# Patient Record
Sex: FEMALE | Race: BLACK OR AFRICAN AMERICAN | NOT HISPANIC OR LATINO | RURAL
[De-identification: names, ages, dates, MRNs, and addresses within clinical notes are randomized per-mention and may not be internally consistent; named-entity substitution may affect disease eponyms.]

---

## 2022-10-31 ENCOUNTER — OFFICE VISIT (OUTPATIENT)
Dept: PRIMARY CARE CLINIC | Facility: CLINIC | Age: 62
End: 2022-10-31
Payer: COMMERCIAL

## 2022-10-31 VITALS
BODY MASS INDEX: 36.19 KG/M2 | HEIGHT: 64 IN | RESPIRATION RATE: 18 BRPM | DIASTOLIC BLOOD PRESSURE: 72 MMHG | HEART RATE: 74 BPM | SYSTOLIC BLOOD PRESSURE: 138 MMHG | OXYGEN SATURATION: 98 % | WEIGHT: 212 LBS

## 2022-10-31 DIAGNOSIS — E03.9 HYPOTHYROIDISM, UNSPECIFIED TYPE: Primary | ICD-10-CM

## 2022-10-31 DIAGNOSIS — E66.9 OBESITY, UNSPECIFIED CLASSIFICATION, UNSPECIFIED OBESITY TYPE, UNSPECIFIED WHETHER SERIOUS COMORBIDITY PRESENT: ICD-10-CM

## 2022-10-31 DIAGNOSIS — I10 HTN (HYPERTENSION), BENIGN: ICD-10-CM

## 2022-10-31 DIAGNOSIS — E78.2 MIXED HYPERLIPIDEMIA: ICD-10-CM

## 2022-10-31 PROCEDURE — 99214 OFFICE O/P EST MOD 30 MIN: CPT | Mod: ,,, | Performed by: FAMILY MEDICINE

## 2022-10-31 PROCEDURE — 99214 PR OFFICE/OUTPT VISIT, EST, LEVL IV, 30-39 MIN: ICD-10-PCS | Mod: ,,, | Performed by: FAMILY MEDICINE

## 2022-10-31 RX ORDER — CARVEDILOL 3.12 MG/1
3.12 TABLET ORAL 2 TIMES DAILY
COMMUNITY
Start: 2022-10-20

## 2022-10-31 RX ORDER — ASPIRIN 81 MG/1
81 TABLET ORAL DAILY
COMMUNITY

## 2022-10-31 RX ORDER — LEVOTHYROXINE SODIUM 75 UG/1
75 TABLET ORAL EVERY MORNING
COMMUNITY
Start: 2022-10-18

## 2022-10-31 RX ORDER — SIMVASTATIN 40 MG/1
1 TABLET, FILM COATED ORAL NIGHTLY
COMMUNITY
Start: 2022-09-06

## 2022-10-31 RX ORDER — PHENTERMINE HYDROCHLORIDE 37.5 MG/1
37.5 TABLET ORAL
Qty: 30 TABLET | Refills: 0 | Status: SHIPPED | OUTPATIENT
Start: 2022-10-31 | End: 2022-11-30 | Stop reason: SDUPTHER

## 2022-10-31 RX ORDER — TRIAMTERENE/HYDROCHLOROTHIAZID 37.5-25 MG
1 TABLET ORAL EVERY MORNING
COMMUNITY
Start: 2022-09-06

## 2022-10-31 NOTE — PROGRESS NOTES
Subjective:      Patient ID: Karlee Brady is a 62 y.o. female.    Chief Complaint: Obesity    Karlee eller 62 y.o. female presents for follow up on all regular problems which are reviewed and discussed.   Htn  hyperlipid etc  Problem List Items Addressed This Visit          Cardiac/Vascular    HTN (hypertension), benign    Mixed hyperlipidemia       Endocrine    Hypothyroidism - Primary    Obesity       Past Medical History:  Past Medical History:   Diagnosis Date    HTN (hypertension), benign     Mixed hyperlipidemia      Past Surgical History:   Procedure Laterality Date    THYROIDECTOMY, BILATERAL      TUBAL LIGATION       Review of patient's allergies indicates:  No Known Allergies  Current Outpatient Medications on File Prior to Visit   Medication Sig Dispense Refill    aspirin (ECOTRIN) 81 MG EC tablet Take 81 mg by mouth once daily.      carvediloL (COREG) 3.125 MG tablet Take 3.125 mg by mouth 2 (two) times daily.      levothyroxine (SYNTHROID) 75 MCG tablet Take 75 mcg by mouth every morning.      simvastatin (ZOCOR) 40 MG tablet Take 1 tablet by mouth nightly.      triamterene-hydrochlorothiazide 37.5-25 mg (MAXZIDE-25) 37.5-25 mg per tablet Take 1 tablet by mouth every morning.       No current facility-administered medications on file prior to visit.     Social History     Socioeconomic History    Marital status:    Tobacco Use    Smoking status: Never    Smokeless tobacco: Never   Substance and Sexual Activity    Alcohol use: Not Currently    Drug use: Never     Family History   Problem Relation Age of Onset    Stroke Mother     Hypertension Mother     Cancer Father        Review of Systems   Constitutional: Negative.  Negative for activity change, appetite change, chills and diaphoresis.   HENT: Negative.  Negative for congestion, ear pain, hearing loss and postnasal drip.    Eyes:  Negative for itching.   Respiratory:  Negative for chest tightness and shortness of breath.   "  Cardiovascular:  Negative for chest pain.   Gastrointestinal:  Negative for abdominal pain.   Endocrine: Negative for polydipsia.   Genitourinary:  Negative for frequency.   Musculoskeletal:  Negative for back pain.   Neurological:  Negative for headaches.     Objective:     /72 (BP Location: Right arm, Patient Position: Sitting, BP Method: Large (Manual))   Pulse 74   Resp 18   Ht 5' 4" (1.626 m)   Wt 96.2 kg (212 lb)   LMP  (LMP Unknown) Comment: had tubal  SpO2 98%   BMI 36.39 kg/m²     Physical Exam  Constitutional:       Appearance: Normal appearance. She is obese.   HENT:      Head: Normocephalic and atraumatic.      Right Ear: External ear normal.      Left Ear: External ear normal.      Nose: Nose normal.      Mouth/Throat:      Mouth: Mucous membranes are moist.      Pharynx: Oropharynx is clear.   Eyes:      Pupils: Pupils are equal, round, and reactive to light.   Cardiovascular:      Rate and Rhythm: Normal rate and regular rhythm.      Heart sounds: Normal heart sounds.   Pulmonary:      Effort: Pulmonary effort is normal.      Breath sounds: Normal breath sounds.   Abdominal:      Palpations: Abdomen is soft.   Musculoskeletal:      Cervical back: Normal range of motion and neck supple.   Skin:     General: Skin is warm and dry.   Neurological:      General: No focal deficit present.      Mental Status: She is alert and oriented to person, place, and time. Mental status is at baseline.   Psychiatric:         Mood and Affect: Mood normal.         Behavior: Behavior normal.         Thought Content: Thought content normal.         Judgment: Judgment normal.       1. Hypothyroidism, unspecified type    2. HTN (hypertension), benign    3. Mixed hyperlipidemia    4. Obesity, unspecified classification, unspecified obesity type, unspecified whether serious comorbidity present        Plan:     Problem List Items Addressed This Visit          Cardiac/Vascular    HTN (hypertension), benign    " Mixed hyperlipidemia       Endocrine    Hypothyroidism - Primary    Obesity     No follow-ups on file.    Risks benefits discussed  1m fu  Rx sent  I am having Karlee Brady start on phentermine. I am also having her maintain her levothyroxine, carvediloL, simvastatin, triamterene-hydrochlorothiazide 37.5-25 mg, and aspirin.    Karlee was seen today for obesity.    Diagnoses and all orders for this visit:    Hypothyroidism, unspecified type    HTN (hypertension), benign    Mixed hyperlipidemia    Obesity, unspecified classification, unspecified obesity type, unspecified whether serious comorbidity present    Other orders  -     phentermine (ADIPEX-P) 37.5 mg tablet; Take 1 tablet (37.5 mg total) by mouth before breakfast.    Medications Ordered This Encounter   Medications    phentermine (ADIPEX-P) 37.5 mg tablet     Sig: Take 1 tablet (37.5 mg total) by mouth before breakfast.     Dispense:  30 tablet     Refill:  0     [unfilled]  No orders of the defined types were placed in this encounter.

## 2022-11-30 ENCOUNTER — OFFICE VISIT (OUTPATIENT)
Dept: PRIMARY CARE CLINIC | Facility: CLINIC | Age: 62
End: 2022-11-30
Payer: COMMERCIAL

## 2022-11-30 VITALS
HEIGHT: 64 IN | BODY MASS INDEX: 35.68 KG/M2 | SYSTOLIC BLOOD PRESSURE: 130 MMHG | DIASTOLIC BLOOD PRESSURE: 72 MMHG | WEIGHT: 209 LBS | HEART RATE: 91 BPM | OXYGEN SATURATION: 99 % | RESPIRATION RATE: 18 BRPM

## 2022-11-30 DIAGNOSIS — I10 HTN (HYPERTENSION), BENIGN: Primary | ICD-10-CM

## 2022-11-30 DIAGNOSIS — E03.9 HYPOTHYROIDISM, UNSPECIFIED TYPE: ICD-10-CM

## 2022-11-30 DIAGNOSIS — E78.2 MIXED HYPERLIPIDEMIA: ICD-10-CM

## 2022-11-30 DIAGNOSIS — E66.9 OBESITY, UNSPECIFIED CLASSIFICATION, UNSPECIFIED OBESITY TYPE, UNSPECIFIED WHETHER SERIOUS COMORBIDITY PRESENT: ICD-10-CM

## 2022-11-30 PROCEDURE — 99214 OFFICE O/P EST MOD 30 MIN: CPT | Mod: ,,, | Performed by: FAMILY MEDICINE

## 2022-11-30 PROCEDURE — 99214 PR OFFICE/OUTPT VISIT, EST, LEVL IV, 30-39 MIN: ICD-10-PCS | Mod: ,,, | Performed by: FAMILY MEDICINE

## 2022-11-30 RX ORDER — PHENTERMINE HYDROCHLORIDE 37.5 MG/1
37.5 TABLET ORAL
Qty: 30 TABLET | Refills: 0 | Status: SHIPPED | OUTPATIENT
Start: 2022-11-30 | End: 2022-12-21 | Stop reason: SDUPTHER

## 2022-11-30 NOTE — PROGRESS NOTES
Subjective:      Patient ID: Karlee Brady is a 62 y.o. female.    Chief Complaint: Follow-up (1mon. Ck-up) and Obesity (#2 Adipex)    Karlee eller 62 y.o. female presents for follow up on all regular problems which are reviewed and discussed.   -3lbs  212-209  Problem List Items Addressed This Visit          Cardiac/Vascular    HTN (hypertension), benign - Primary    Mixed hyperlipidemia       Endocrine    Hypothyroidism    Obesity       Past Medical History:  Past Medical History:   Diagnosis Date    HTN (hypertension), benign     Mixed hyperlipidemia      Past Surgical History:   Procedure Laterality Date    THYROIDECTOMY, BILATERAL      TUBAL LIGATION       Review of patient's allergies indicates:  No Known Allergies  Current Outpatient Medications on File Prior to Visit   Medication Sig Dispense Refill    aspirin (ECOTRIN) 81 MG EC tablet Take 81 mg by mouth once daily.      carvediloL (COREG) 3.125 MG tablet Take 3.125 mg by mouth 2 (two) times daily.      levothyroxine (SYNTHROID) 75 MCG tablet Take 75 mcg by mouth every morning.      simvastatin (ZOCOR) 40 MG tablet Take 1 tablet by mouth nightly.      triamterene-hydrochlorothiazide 37.5-25 mg (MAXZIDE-25) 37.5-25 mg per tablet Take 1 tablet by mouth every morning.      [DISCONTINUED] phentermine (ADIPEX-P) 37.5 mg tablet Take 1 tablet (37.5 mg total) by mouth before breakfast. 30 tablet 0     No current facility-administered medications on file prior to visit.     Social History     Socioeconomic History    Marital status:    Tobacco Use    Smoking status: Never    Smokeless tobacco: Never   Substance and Sexual Activity    Alcohol use: Not Currently    Drug use: Never     Family History   Problem Relation Age of Onset    Stroke Mother     Hypertension Mother     Cancer Father        Review of Systems   Constitutional: Negative.  Negative for activity change, appetite change, chills and diaphoresis.   HENT: Negative.  Negative for congestion,  "ear pain, hearing loss and postnasal drip.    Eyes:  Negative for itching.   Respiratory:  Negative for chest tightness and shortness of breath.    Cardiovascular:  Negative for chest pain.   Gastrointestinal:  Negative for abdominal pain.   Endocrine: Negative for polydipsia.   Genitourinary:  Negative for frequency.   Musculoskeletal:  Negative for back pain.   Neurological:  Negative for headaches.     Objective:     /72 (BP Location: Right arm, Patient Position: Sitting, BP Method: Large (Manual))   Pulse 91   Resp 18   Ht 5' 4" (1.626 m)   Wt 94.8 kg (209 lb)   LMP  (LMP Unknown) Comment: had tubal  SpO2 99%   BMI 35.87 kg/m²     Physical Exam  Constitutional:       Appearance: Normal appearance. She is obese.   HENT:      Head: Normocephalic and atraumatic.      Right Ear: External ear normal.      Left Ear: External ear normal.      Nose: Nose normal.      Mouth/Throat:      Mouth: Mucous membranes are moist.      Pharynx: Oropharynx is clear.   Eyes:      Pupils: Pupils are equal, round, and reactive to light.   Cardiovascular:      Rate and Rhythm: Normal rate and regular rhythm.      Heart sounds: Normal heart sounds.   Pulmonary:      Effort: Pulmonary effort is normal.      Breath sounds: Normal breath sounds.   Abdominal:      Palpations: Abdomen is soft.   Musculoskeletal:      Cervical back: Normal range of motion and neck supple.   Skin:     General: Skin is warm and dry.   Neurological:      General: No focal deficit present.      Mental Status: She is alert and oriented to person, place, and time. Mental status is at baseline.   Psychiatric:         Mood and Affect: Mood normal.         Behavior: Behavior normal.         Thought Content: Thought content normal.         Judgment: Judgment normal.       1. HTN (hypertension), benign    2. Mixed hyperlipidemia    3. Hypothyroidism, unspecified type    4. Obesity, unspecified classification, unspecified obesity type, unspecified whether " serious comorbidity present        Plan:     Problem List Items Addressed This Visit          Cardiac/Vascular    HTN (hypertension), benign - Primary    Mixed hyperlipidemia       Endocrine    Hypothyroidism    Obesity     No follow-ups on file.  1m fu    I am having Karlee Brady maintain her levothyroxine, carvediloL, simvastatin, triamterene-hydrochlorothiazide 37.5-25 mg, aspirin, and phentermine.    Karlee was seen today for follow-up and obesity.    Diagnoses and all orders for this visit:    HTN (hypertension), benign    Mixed hyperlipidemia    Hypothyroidism, unspecified type    Obesity, unspecified classification, unspecified obesity type, unspecified whether serious comorbidity present    Other orders  -     phentermine (ADIPEX-P) 37.5 mg tablet; Take 1 tablet (37.5 mg total) by mouth before breakfast.    Medications Ordered This Encounter   Medications    phentermine (ADIPEX-P) 37.5 mg tablet     Sig: Take 1 tablet (37.5 mg total) by mouth before breakfast.     Dispense:  30 tablet     Refill:  0     [unfilled]  No orders of the defined types were placed in this encounter.

## 2022-12-21 ENCOUNTER — OFFICE VISIT (OUTPATIENT)
Dept: PRIMARY CARE CLINIC | Facility: CLINIC | Age: 62
End: 2022-12-21
Payer: COMMERCIAL

## 2022-12-21 VITALS
DIASTOLIC BLOOD PRESSURE: 82 MMHG | BODY MASS INDEX: 34.66 KG/M2 | HEART RATE: 86 BPM | SYSTOLIC BLOOD PRESSURE: 122 MMHG | HEIGHT: 64 IN | OXYGEN SATURATION: 97 % | RESPIRATION RATE: 18 BRPM | WEIGHT: 203 LBS

## 2022-12-21 DIAGNOSIS — E66.9 OBESITY, UNSPECIFIED CLASSIFICATION, UNSPECIFIED OBESITY TYPE, UNSPECIFIED WHETHER SERIOUS COMORBIDITY PRESENT: ICD-10-CM

## 2022-12-21 DIAGNOSIS — E03.9 HYPOTHYROIDISM, UNSPECIFIED TYPE: ICD-10-CM

## 2022-12-21 DIAGNOSIS — I10 HTN (HYPERTENSION), BENIGN: Primary | ICD-10-CM

## 2022-12-21 PROCEDURE — 99214 OFFICE O/P EST MOD 30 MIN: CPT | Mod: ,,, | Performed by: FAMILY MEDICINE

## 2022-12-21 PROCEDURE — 99214 PR OFFICE/OUTPT VISIT, EST, LEVL IV, 30-39 MIN: ICD-10-PCS | Mod: ,,, | Performed by: FAMILY MEDICINE

## 2022-12-21 RX ORDER — PHENTERMINE HYDROCHLORIDE 37.5 MG/1
37.5 TABLET ORAL
Qty: 30 TABLET | Refills: 0 | Status: SHIPPED | OUTPATIENT
Start: 2022-12-21 | End: 2023-01-20

## 2022-12-21 NOTE — PROGRESS NOTES
Subjective:      Patient ID: Karlee Brady is a 62 y.o. female.    Chief Complaint: Follow-up (1mon. Ck-up) and Obesity (#3 Adipex)    Karlee eller 62 y.o. female presents for follow up on all regular problems which are reviewed and discussed.   209-203  [9 total]  Problem List Items Addressed This Visit          Cardiac/Vascular    HTN (hypertension), benign - Primary       Endocrine    Hypothyroidism    Obesity       Past Medical History:  Past Medical History:   Diagnosis Date    HTN (hypertension), benign     Mixed hyperlipidemia      Past Surgical History:   Procedure Laterality Date    THYROIDECTOMY, BILATERAL      TUBAL LIGATION       Review of patient's allergies indicates:  No Known Allergies  Current Outpatient Medications on File Prior to Visit   Medication Sig Dispense Refill    aspirin (ECOTRIN) 81 MG EC tablet Take 81 mg by mouth once daily.      carvediloL (COREG) 3.125 MG tablet Take 3.125 mg by mouth 2 (two) times daily.      levothyroxine (SYNTHROID) 75 MCG tablet Take 75 mcg by mouth every morning.      simvastatin (ZOCOR) 40 MG tablet Take 1 tablet by mouth nightly.      triamterene-hydrochlorothiazide 37.5-25 mg (MAXZIDE-25) 37.5-25 mg per tablet Take 1 tablet by mouth every morning.      [DISCONTINUED] phentermine (ADIPEX-P) 37.5 mg tablet Take 1 tablet (37.5 mg total) by mouth before breakfast. 30 tablet 0     No current facility-administered medications on file prior to visit.     Social History     Socioeconomic History    Marital status:    Tobacco Use    Smoking status: Never    Smokeless tobacco: Never   Substance and Sexual Activity    Alcohol use: Not Currently    Drug use: Never     Family History   Problem Relation Age of Onset    Stroke Mother     Hypertension Mother     Cancer Father        Review of Systems   Constitutional: Negative.  Negative for activity change, appetite change, chills and diaphoresis.   HENT: Negative.  Negative for congestion, ear pain, hearing  "loss and postnasal drip.    Eyes:  Negative for itching.   Respiratory:  Negative for chest tightness and shortness of breath.    Cardiovascular:  Negative for chest pain.   Gastrointestinal:  Negative for abdominal pain.   Endocrine: Negative for polydipsia.   Genitourinary:  Negative for frequency.   Musculoskeletal:  Negative for back pain.   Neurological:  Negative for headaches.     Objective:     /82 (BP Location: Left arm, Patient Position: Sitting, BP Method: Large (Manual))   Pulse 86   Resp 18   Ht 5' 4" (1.626 m)   Wt 92.1 kg (203 lb)   LMP  (LMP Unknown) Comment: had tubal  SpO2 97%   BMI 34.84 kg/m²     Physical Exam  Constitutional:       Appearance: Normal appearance. She is obese.   HENT:      Head: Normocephalic and atraumatic.      Right Ear: External ear normal.      Left Ear: External ear normal.      Nose: Nose normal.      Mouth/Throat:      Mouth: Mucous membranes are moist.      Pharynx: Oropharynx is clear.   Eyes:      Pupils: Pupils are equal, round, and reactive to light.   Cardiovascular:      Rate and Rhythm: Normal rate and regular rhythm.      Heart sounds: Normal heart sounds.   Pulmonary:      Effort: Pulmonary effort is normal.      Breath sounds: Normal breath sounds.   Abdominal:      Palpations: Abdomen is soft.   Musculoskeletal:      Cervical back: Normal range of motion and neck supple.   Skin:     General: Skin is warm and dry.   Neurological:      General: No focal deficit present.      Mental Status: She is alert and oriented to person, place, and time. Mental status is at baseline.   Psychiatric:         Mood and Affect: Mood normal.         Behavior: Behavior normal.         Thought Content: Thought content normal.         Judgment: Judgment normal.       1. HTN (hypertension), benign    2. Hypothyroidism, unspecified type    3. Obesity, unspecified classification, unspecified obesity type, unspecified whether serious comorbidity present        Plan: "     Problem List Items Addressed This Visit          Cardiac/Vascular    HTN (hypertension), benign - Primary       Endocrine    Hypothyroidism    Obesity     No follow-ups on file.  1m fu    I am having Karlee Brady maintain her levothyroxine, carvediloL, simvastatin, triamterene-hydrochlorothiazide 37.5-25 mg, aspirin, and phentermine.    Karlee was seen today for follow-up and obesity.    Diagnoses and all orders for this visit:    HTN (hypertension), benign    Hypothyroidism, unspecified type    Obesity, unspecified classification, unspecified obesity type, unspecified whether serious comorbidity present    Other orders  -     phentermine (ADIPEX-P) 37.5 mg tablet; Take 1 tablet (37.5 mg total) by mouth before breakfast.      Medications Ordered This Encounter   Medications    phentermine (ADIPEX-P) 37.5 mg tablet     Sig: Take 1 tablet (37.5 mg total) by mouth before breakfast.     Dispense:  30 tablet     Refill:  0     [unfilled]  No orders of the defined types were placed in this encounter.

## 2024-10-18 DIAGNOSIS — F03.90 DEMENTIA: Primary | ICD-10-CM

## 2024-11-22 ENCOUNTER — OFFICE VISIT (OUTPATIENT)
Dept: NEUROLOGY | Facility: CLINIC | Age: 64
End: 2024-11-22
Payer: MEDICARE

## 2024-11-22 VITALS
BODY MASS INDEX: 29.88 KG/M2 | DIASTOLIC BLOOD PRESSURE: 80 MMHG | WEIGHT: 175 LBS | OXYGEN SATURATION: 98 % | SYSTOLIC BLOOD PRESSURE: 132 MMHG | HEIGHT: 64 IN | HEART RATE: 83 BPM | RESPIRATION RATE: 18 BRPM

## 2024-11-22 DIAGNOSIS — R41.3 MEMORY LOSS OR IMPAIRMENT: Primary | ICD-10-CM

## 2024-11-22 DIAGNOSIS — F03.90 DEMENTIA: ICD-10-CM

## 2024-11-22 DIAGNOSIS — F33.2 SEVERE EPISODE OF RECURRENT MAJOR DEPRESSIVE DISORDER, WITHOUT PSYCHOTIC FEATURES: ICD-10-CM

## 2024-11-22 DIAGNOSIS — R41.3 OTHER AMNESIA: ICD-10-CM

## 2024-11-22 PROCEDURE — 99999 PR PBB SHADOW E&M-EST. PATIENT-LVL V: CPT | Mod: PBBFAC,,, | Performed by: PSYCHIATRY & NEUROLOGY

## 2024-11-22 PROCEDURE — 99204 OFFICE O/P NEW MOD 45 MIN: CPT | Mod: S$PBB,,, | Performed by: PSYCHIATRY & NEUROLOGY

## 2024-11-22 PROCEDURE — 99215 OFFICE O/P EST HI 40 MIN: CPT | Mod: PBBFAC | Performed by: PSYCHIATRY & NEUROLOGY

## 2024-11-22 RX ORDER — ACETAMINOPHEN 325 MG/1
325 TABLET ORAL
COMMUNITY

## 2024-11-22 RX ORDER — MIRTAZAPINE 15 MG/1
15 TABLET, FILM COATED ORAL
COMMUNITY
Start: 2024-10-04

## 2024-11-22 RX ORDER — CLONAZEPAM 0.5 MG/1
0.5 TABLET ORAL
COMMUNITY
Start: 2024-10-18

## 2024-11-22 RX ORDER — DONEPEZIL HYDROCHLORIDE 5 MG/1
5 TABLET, FILM COATED ORAL
COMMUNITY
Start: 2024-10-30

## 2024-11-22 RX ORDER — GABAPENTIN 300 MG/1
300 CAPSULE ORAL
COMMUNITY
Start: 2024-11-12 | End: 2025-02-10

## 2024-11-22 NOTE — PROGRESS NOTES
"    Subjective:       Patient ID: Karlee Brady is a 64 y.o. female     Chief Complaint:    Chief Complaint   Patient presents with    forgetfulness    Agitation     Pt. States  forgetfulness. States feel like she not as fatigue. Unstable gait. Pt. States gaining a lot of weight.        Allergies:  Betadine surgi-prep    Current Medications:    Outpatient Encounter Medications as of 11/22/2024   Medication Sig Dispense Refill    acetaminophen (TYLENOL) 325 MG tablet Take 325 mg by mouth.      aspirin (ECOTRIN) 81 MG EC tablet Take 81 mg by mouth once daily.      carvediloL (COREG) 3.125 MG tablet Take 3.125 mg by mouth 2 (two) times daily.      clonazePAM (KLONOPIN) 0.5 MG tablet Take 0.5 mg by mouth.      donepeziL (ARICEPT) 5 MG tablet Take 5 mg by mouth.      gabapentin (NEURONTIN) 300 MG capsule Take 300 mg by mouth.      levothyroxine (SYNTHROID) 75 MCG tablet Take 75 mcg by mouth every morning.      mirtazapine (REMERON) 15 MG tablet Take 15 mg by mouth.      simvastatin (ZOCOR) 40 MG tablet Take 1 tablet by mouth nightly.      triamterene-hydrochlorothiazide 37.5-25 mg (MAXZIDE-25) 37.5-25 mg per tablet Take 1 tablet by mouth every morning.       No facility-administered encounter medications on file as of 11/22/2024.       History of Present Illness  65 yo BF w/ mult medical problems as well as depression and anxiety on meds for such referred for "memory Issues"   Pt gets forgetful, memory lapses, confusion, etc mostlyl worse when fatigued or exhausted which makes sense and recently started on Aricept 5 mg daily     Pt states her mind "races" and her symptoms began about 6 yrs ago when she "fell out " at [employer] and had spells of irritabiilty, belligerence, moaning, etc    She had bad bout of depression major refractory when she did not get out of bed and stopped eating, stopped taking meds, lost interest in once pleasurable activity, etc   Pt drives indep w/o getting lost, manipulates GPS, dresses well and " "handles all ADL's, pays all bills, finances and legal issues, etc   All the above would exclude and level of actual dementia    Has been seeing a Psych NP but only a couple of sessions and she is desiring a spiritually based counseling                      Past Medical History:   Diagnosis Date    HTN (hypertension), benign     Mixed hyperlipidemia        Past Surgical History:   Procedure Laterality Date    THYROIDECTOMY, BILATERAL      TUBAL LIGATION         Social History  Ms. Brady  reports that she has never smoked. She has never used smokeless tobacco. She reports that she does not currently use alcohol. She reports that she does not use drugs.    Family History  Ms.'s Brady family history includes Cancer in her father; Hypertension in her mother; Stroke in her mother.    Review of Systems  Review of Systems   Constitutional:  Positive for malaise/fatigue.   Psychiatric/Behavioral:  Positive for depression and memory loss. The patient is nervous/anxious.    All other systems reviewed and are negative.     Objective:   /80 (BP Location: Right arm, Patient Position: Sitting)   Pulse 83   Resp 18   Ht 5' 4" (1.626 m)   Wt 79.4 kg (175 lb)   LMP  (LMP Unknown) Comment: had tubal  SpO2 98%   BMI 30.04 kg/m²    NEUROLOGICAL EXAMINATION:     MENTAL STATUS   Oriented to person, place, and time.   Level of consciousness: drowsy  Knowledge: consistent with education.        Anxiety/depression/malaise      CRANIAL NERVES   Cranial nerves II through XII intact.     MOTOR EXAM     Strength   Strength 5/5 throughout.     GAIT AND COORDINATION     Gait  Gait: normal       Physical Exam  Vitals reviewed.   Constitutional:       Appearance: She is normal weight.   Neurological:      General: No focal deficit present.      Mental Status: She is alert and oriented to person, place, and time. Mental status is at baseline.      Cranial Nerves: Cranial nerves 2-12 are intact.      Motor: Motor strength is normal.   "   Gait: Gait is intact.          Assessment:     Memory loss or impairment  -     Creatinine, serum; Future; Expected date: 11/22/2024    Dementia  -     Ambulatory referral/consult to Neurology    Other amnesia  -     MRI Brain W WO Contrast; Future; Expected date: 11/22/2024         Primary Diagnosis and ICD10  Memory loss or impairment [R41.3]    Plan:     Patient Instructions   Mri brain w/contrast  Cont Aricept 5 mg daily  Caution w/ excess meds  Incr phys activity daily   Needs Behav Health eval for psych issues  F/u 3 months     There are no discontinued medications.    Requested Prescriptions      No prescriptions requested or ordered in this encounter

## 2024-11-22 NOTE — PATIENT INSTRUCTIONS
May stop Aricept 5 mg daily  Caution w/ excess meds  Incr phys activity daily   Needs Behav Health eval ASAP for psych issues  F/u prn

## 2024-12-31 ENCOUNTER — TELEPHONE (OUTPATIENT)
Dept: NEUROLOGY | Facility: CLINIC | Age: 64
End: 2024-12-31
Payer: COMMERCIAL

## 2025-01-14 DIAGNOSIS — R41.3 OTHER AMNESIA: Primary | ICD-10-CM

## 2025-03-27 ENCOUNTER — OFFICE VISIT (OUTPATIENT)
Dept: NEUROLOGY | Facility: CLINIC | Age: 65
End: 2025-03-27
Payer: COMMERCIAL

## 2025-03-27 VITALS
BODY MASS INDEX: 31.24 KG/M2 | RESPIRATION RATE: 18 BRPM | HEIGHT: 64 IN | SYSTOLIC BLOOD PRESSURE: 144 MMHG | DIASTOLIC BLOOD PRESSURE: 82 MMHG | WEIGHT: 183 LBS | HEART RATE: 60 BPM | OXYGEN SATURATION: 100 %

## 2025-03-27 DIAGNOSIS — R41.3 MEMORY LOSS OR IMPAIRMENT: Primary | ICD-10-CM

## 2025-03-27 PROCEDURE — 99214 OFFICE O/P EST MOD 30 MIN: CPT | Mod: S$PBB,,, | Performed by: PSYCHIATRY & NEUROLOGY

## 2025-03-27 PROCEDURE — 3079F DIAST BP 80-89 MM HG: CPT | Mod: ,,, | Performed by: PSYCHIATRY & NEUROLOGY

## 2025-03-27 PROCEDURE — 1126F AMNT PAIN NOTED NONE PRSNT: CPT | Mod: ,,, | Performed by: PSYCHIATRY & NEUROLOGY

## 2025-03-27 PROCEDURE — 3008F BODY MASS INDEX DOCD: CPT | Mod: ,,, | Performed by: PSYCHIATRY & NEUROLOGY

## 2025-03-27 PROCEDURE — 1101F PT FALLS ASSESS-DOCD LE1/YR: CPT | Mod: ,,, | Performed by: PSYCHIATRY & NEUROLOGY

## 2025-03-27 PROCEDURE — 99999 PR PBB SHADOW E&M-EST. PATIENT-LVL V: CPT | Mod: PBBFAC,,, | Performed by: PSYCHIATRY & NEUROLOGY

## 2025-03-27 PROCEDURE — 1159F MED LIST DOCD IN RCRD: CPT | Mod: ,,, | Performed by: PSYCHIATRY & NEUROLOGY

## 2025-03-27 PROCEDURE — 99215 OFFICE O/P EST HI 40 MIN: CPT | Mod: PBBFAC | Performed by: PSYCHIATRY & NEUROLOGY

## 2025-03-27 PROCEDURE — 3288F FALL RISK ASSESSMENT DOCD: CPT | Mod: ,,, | Performed by: PSYCHIATRY & NEUROLOGY

## 2025-03-27 PROCEDURE — 3077F SYST BP >= 140 MM HG: CPT | Mod: ,,, | Performed by: PSYCHIATRY & NEUROLOGY

## 2025-03-27 RX ORDER — DONEPEZIL HYDROCHLORIDE 5 MG/1
5 TABLET, FILM COATED ORAL DAILY
Qty: 90 TABLET | Refills: 3 | Status: SHIPPED | OUTPATIENT
Start: 2025-03-27

## 2025-03-27 RX ORDER — NIFEDIPINE 30 MG/1
1 TABLET, EXTENDED RELEASE ORAL DAILY
COMMUNITY
Start: 2025-02-12

## 2025-03-27 NOTE — PROGRESS NOTES
Subjective:       Patient ID: Karlee Brady is a 65 y.o. female     Chief Complaint:    Chief Complaint   Patient presents with    abnormal MRI results     Pt. States here for MRI results.        Allergies:  Betadine surgi-prep    Current Medications:  Encounter Medications[1]    History of Present Illness  66 yo BF in f/u for memory issues poss secondary to chronic depression and anxiety  MRI Brain W WO Contrast  Impression: No acute intracranial process  Mildly progressive small vessel disease compared to the 2019 study    Placed on Aricept 3-4 months ago but she stopped after one month  She also read the SE profile on Gabapentin   Pt desired spiritual counseling last OV to address psych needs for depression/anxiety which could be masks for her forgetfulness, memory issues              Past Medical History:   Diagnosis Date    HTN (hypertension), benign     Mixed hyperlipidemia        Past Surgical History:   Procedure Laterality Date    THYROIDECTOMY, BILATERAL      TUBAL LIGATION         Social History  Ms. Brady  reports that she has never smoked. She has never used smokeless tobacco. She reports that she does not currently use alcohol. She reports that she does not use drugs.    Family History  Ms.'s Brady family history includes Cancer in her father; Hypertension in her mother; Stroke in her mother.    Review of Systems  Review of Systems   Psychiatric/Behavioral:  Positive for depression and memory loss. The patient is nervous/anxious.    All other systems reviewed and are negative.     Objective:   LMP  (LMP Unknown) Comment: had tubal   NEUROLOGICAL EXAMINATION:     MENTAL STATUS   Oriented to person, place, and time.   Level of consciousness: drowsy  Knowledge: consistent with education.        Depression and anxiety     CRANIAL NERVES   Cranial nerves II through XII intact.     MOTOR EXAM     Strength   Strength 5/5 throughout.     GAIT AND COORDINATION     Gait  Gait: normal        Physical Exam  Vitals reviewed.   Constitutional:       Appearance: She is normal weight.   Neurological:      General: No focal deficit present.      Mental Status: She is oriented to person, place, and time. Mental status is at baseline.      Cranial Nerves: Cranial nerves 2-12 are intact.      Motor: Motor strength is normal.     Gait: Gait is intact.        Assessment:     Memory loss or impairment         Primary Diagnosis and ICD10  Memory loss or impairment [R41.3]    Plan:     There are no Patient Instructions on file for this visit.    There are no discontinued medications.    Requested Prescriptions      No prescriptions requested or ordered in this encounter              [1]  Outpatient Encounter Medications as of 3/27/2025   Medication Sig Dispense Refill    acetaminophen (TYLENOL) 325 MG tablet Take 325 mg by mouth.      aspirin (ECOTRIN) 81 MG EC tablet Take 81 mg by mouth once daily.      carvediloL (COREG) 3.125 MG tablet Take 3.125 mg by mouth 2 (two) times daily.      clonazePAM (KLONOPIN) 0.5 MG tablet Take 0.5 mg by mouth.      donepeziL (ARICEPT) 5 MG tablet Take 5 mg by mouth.      gabapentin (NEURONTIN) 300 MG capsule Take 300 mg by mouth.      levothyroxine (SYNTHROID) 75 MCG tablet Take 75 mcg by mouth every morning.      mirtazapine (REMERON) 15 MG tablet Take 15 mg by mouth.      simvastatin (ZOCOR) 40 MG tablet Take 1 tablet by mouth nightly.      triamterene-hydrochlorothiazide 37.5-25 mg (MAXZIDE-25) 37.5-25 mg per tablet Take 1 tablet by mouth every morning.       No facility-administered encounter medications on file as of 3/27/2025.